# Patient Record
Sex: MALE | URBAN - METROPOLITAN AREA
[De-identification: names, ages, dates, MRNs, and addresses within clinical notes are randomized per-mention and may not be internally consistent; named-entity substitution may affect disease eponyms.]

---

## 2018-04-10 ENCOUNTER — APPOINTMENT (RX ONLY)
Dept: URBAN - METROPOLITAN AREA CLINIC 64 | Facility: CLINIC | Age: 53
Setting detail: DERMATOLOGY
End: 2018-04-10

## 2018-04-10 DIAGNOSIS — L98419 CHRONIC ULCER OF OTHER SPECIFIED SITES: ICD-10-CM

## 2018-04-10 DIAGNOSIS — L98429 CHRONIC ULCER OF OTHER SPECIFIED SITES: ICD-10-CM

## 2018-04-10 PROBLEM — L85.3 XEROSIS CUTIS: Status: ACTIVE | Noted: 2018-04-10

## 2018-04-10 PROBLEM — I10 ESSENTIAL (PRIMARY) HYPERTENSION: Status: ACTIVE | Noted: 2018-04-10

## 2018-04-10 PROBLEM — L98.499 NON-PRESSURE CHRONIC ULCER OF SKIN OF OTHER SITES WITH UNSPECIFIED SEVERITY: Status: ACTIVE | Noted: 2018-04-10

## 2018-04-10 PROCEDURE — ? COUNSELING

## 2018-04-10 PROCEDURE — ? RECOMMENDATIONS

## 2018-04-10 PROCEDURE — 99354: CPT

## 2018-04-10 PROCEDURE — ? PROLONGED SERVICES

## 2018-04-10 PROCEDURE — ? DIAGNOSIS COMMENT

## 2018-04-10 PROCEDURE — 99202 OFFICE O/P NEW SF 15 MIN: CPT

## 2018-04-10 ASSESSMENT — LOCATION ZONE DERM: LOCATION ZONE: ARM

## 2018-04-10 ASSESSMENT — AREA OF WOUND IN CM2: TOTAL AREA OF WOUND IN CM2: 20

## 2018-04-10 ASSESSMENT — LOCATION DETAILED DESCRIPTION DERM: LOCATION DETAILED: RIGHT ANTECUBITAL SKIN

## 2018-04-10 ASSESSMENT — LOCATION SIMPLE DESCRIPTION DERM: LOCATION SIMPLE: RIGHT ELBOW

## 2018-04-10 NOTE — PROCEDURE: COUNSELING
Detail Level: Detailed
Patient Specific Counseling (Will Not Stick From Patient To Patient): Discussed today 4/16/ 2018 with his nephrologist Dr. 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 \\n- consideration for pain management consultation\\n- surgical debridement of necrotic tissue, thru general surgeon\\n- mortality in patients with calciphylaxis increases significantly

## 2018-04-10 NOTE — HPI: SKIN LESION
How Severe Is Your Skin Lesion?: moderate
Has Your Skin Lesion Been Treated?: been treated
Is This A New Presentation, Or A Follow-Up?: Skin Lesion
Additional History: Hospital had dx it as cellulitis, gave meds and it didn’t work.  Last week the dialysis place gave intravenous vancomycin (m,w,f) and it didn’t help. Ulcer is about 4 x 5 cm. Denied constitutional symptoms, no other similar lesions.

## 2018-04-10 NOTE — PROCEDURE: RECOMMENDATIONS
Detail Level: Zone
Recommendations (Free Text): I have reviewed his lab work , phosphorus increases despite parathyroidectomy. He notes he is not a candidate for kidney transplant. He is going on HED 3 times a week. Recently wound was treated for cellulitis. He also notes been no heparin and warfarin in the past, not at this moment. Will coordinate to perform biopsy and culture ASAP.
Recommendation Preamble: The following recommendations were made during the visit: come back for a culture and a bx. Will not prescribe any tx today until we know the results of bx and culture. Advised that pt can get debridement in the OR if this is consistent with calciphylaxis.  Use telfa pads and tape to cover and protect wound. Pt can also use plain Vaseline. Discontinue use of neosporin.

## 2018-04-12 ENCOUNTER — APPOINTMENT (RX ONLY)
Dept: URBAN - METROPOLITAN AREA CLINIC 64 | Facility: CLINIC | Age: 53
Setting detail: DERMATOLOGY
End: 2018-04-12

## 2018-04-12 DIAGNOSIS — L98429 CHRONIC ULCER OF OTHER SPECIFIED SITES: ICD-10-CM

## 2018-04-12 DIAGNOSIS — L98419 CHRONIC ULCER OF OTHER SPECIFIED SITES: ICD-10-CM

## 2018-04-12 PROBLEM — L98.499 NON-PRESSURE CHRONIC ULCER OF SKIN OF OTHER SITES WITH UNSPECIFIED SEVERITY: Status: ACTIVE | Noted: 2018-04-12

## 2018-04-12 PROCEDURE — ? BIOPSY FOR TISSUE CULTURE

## 2018-04-12 PROCEDURE — 11101: CPT

## 2018-04-12 PROCEDURE — ? BIOPSY BY PUNCH METHOD

## 2018-04-12 PROCEDURE — ? PRESCRIPTION

## 2018-04-12 PROCEDURE — 11100: CPT

## 2018-04-12 RX ORDER — TRAMADOL HYDROCHLORIDE AND ACETAMINOPHEN 37.5; 325 MG/1; MG/1
TABLET, COATED ORAL
Qty: 20 | Refills: 0 | COMMUNITY
Start: 2018-04-12

## 2018-04-12 RX ADMIN — TRAMADOL HYDROCHLORIDE AND ACETAMINOPHEN: 37.5; 325 TABLET, COATED ORAL at 18:10

## 2018-04-12 ASSESSMENT — LOCATION DETAILED DESCRIPTION DERM: LOCATION DETAILED: RIGHT VENTRAL PROXIMAL FOREARM

## 2018-04-12 ASSESSMENT — LOCATION ZONE DERM: LOCATION ZONE: ARM

## 2018-04-12 ASSESSMENT — LOCATION SIMPLE DESCRIPTION DERM: LOCATION SIMPLE: RIGHT FOREARM

## 2018-04-12 NOTE — PROCEDURE: BIOPSY BY PUNCH METHOD
Post-Care Instructions: I reviewed with the patient in detail post-care instructions. Patient is to keep the biopsy site dry overnight, and then apply bacitracin twice daily until healed. Patient may apply hydrogen peroxide soaks to remove any crusting.
Bill [id number] For Specimen Handling/Conveyance To Laboratory?: no
Billing Type: Third-Party Bill
Epidermal Sutures: 4-0 Ethilon
Suture Removal: 14 days
Consent: Written consent was obtained and risks were reviewed including but not limited to scarring, infection, bleeding, scabbing, incomplete removal, nerve damage and allergy to anesthesia.
Notification Instructions: Patient will be notified of biopsy results. However, patient instructed to call the office if not contacted within 2 weeks.
Body Location Override (Optional - Billing Will Still Be Based On Selected Body Map Location If Applicable): Right forearm
Punch Size In Mm: 4
Home Suture Removal Text: Patient was provided a home suture removal kit and will remove their sutures at home.  If they have any questions or difficulties they will call the office.
Hemostasis: None
X Depth Of Punch In Cm (Optional): 0
Anesthesia Type: 1% Xylocaine without epinephrine
Detail Level: Simple
Wound Care: Petrolatum
Was A Bandage Applied: Yes
Anesthesia Volume In Cc (Will Not Render If 0): 7
Dressing: bandage
Biopsy Type: H and E

## 2018-04-12 NOTE — PROCEDURE: BIOPSY FOR TISSUE CULTURE
Patient Will Remove Sutures At Home?: No
Hemostasis: None
Post-Care Instructions: I reviewed with the patient in detail post-care instructions. Patient is to keep the biopsy site dry overnight, and then apply bacitracin twice daily until healed. Patient may apply hydrogen peroxide soaks to remove any crusting.
Wound Care: Petrolatum
X Size Of Lesion In Cm (Optional): 0
Dressing: bandage
Detail Level: Simple
Body Location Override (Optional - Billing Will Still Be Based On Selected Body Map Location If Applicable): Right forearm
Biopsy Method: Dermablade
Anesthesia Volume In Cc (Will Not Render If 0): 7
Suture Removal: 14 days
Punch Or Shave Technique: Punch
Home Suture Removal Text: Patient was provided a home suture removal kit and will remove their sutures at home.  If they have any questions or difficulties they will call the office.
Anesthesia Type: 1% Xylocaine without epinephrine
Epidermal Sutures: 4-0 Ethilon
Procedure Information: This plan will only bill for the biopsy.  The individual tests must be ordered in the 'Order Test' plan.  Bacterial Tissue Cultures: 20474-3.  Fungal Tissue Cultures:  578-5.  Mycobacterium Tissue Cultures: 540-5.
Notification Instructions: Patient will be notified of culture results when they are available. Patient was informed that tissue cultures can take some time to complete and that we will inform them when we get a result.
Punch Size In Mm: 4
Consent: Written consent was obtained and risks were reviewed including but not limited to scarring, infection, bleeding, scabbing, nerve damage and allergy to anesthesia.

## 2018-04-12 NOTE — HPI: PROCEDURE (SKIN BIOPSY)
How Severe Is Your Condition?: moderate
Body Location Override (Optional): Right forearm
Additional History: Please see previous note from 2 days ago. Vaseline and telfa has helped with pain.

## 2018-04-26 ENCOUNTER — APPOINTMENT (RX ONLY)
Dept: URBAN - METROPOLITAN AREA CLINIC 64 | Facility: CLINIC | Age: 53
Setting detail: DERMATOLOGY
End: 2018-04-26

## 2018-04-26 DIAGNOSIS — Z48.02 ENCOUNTER FOR REMOVAL OF SUTURES: ICD-10-CM

## 2018-04-26 DIAGNOSIS — L98419 CHRONIC ULCER OF OTHER SPECIFIED SITES: ICD-10-CM

## 2018-04-26 DIAGNOSIS — L98429 CHRONIC ULCER OF OTHER SPECIFIED SITES: ICD-10-CM

## 2018-04-26 PROBLEM — L98.491 NON-PRESSURE CHRONIC ULCER OF SKIN OF OTHER SITES LIMITED TO BREAKDOWN OF SKIN: Status: ACTIVE | Noted: 2018-04-26

## 2018-04-26 PROCEDURE — ? ORDER TESTS

## 2018-04-26 PROCEDURE — ? COUNSELING

## 2018-04-26 PROCEDURE — ? SUTURE REMOVAL (GLOBAL PERIOD)

## 2018-04-26 PROCEDURE — ? ADDITIONAL NOTES

## 2018-04-26 PROCEDURE — ? RADIOLOGY REPORTS REVIEWED

## 2018-04-26 PROCEDURE — 99213 OFFICE O/P EST LOW 20 MIN: CPT

## 2018-04-26 PROCEDURE — 99024 POSTOP FOLLOW-UP VISIT: CPT

## 2018-04-26 ASSESSMENT — LOCATION DETAILED DESCRIPTION DERM
LOCATION DETAILED: RIGHT VENTRAL PROXIMAL FOREARM
LOCATION DETAILED: RIGHT VENTRAL MEDIAL PROXIMAL FOREARM

## 2018-04-26 ASSESSMENT — LOCATION SIMPLE DESCRIPTION DERM: LOCATION SIMPLE: RIGHT FOREARM

## 2018-04-26 ASSESSMENT — AREA OF WOUND IN CM2: TOTAL AREA OF WOUND IN CM2: 12

## 2018-04-26 ASSESSMENT — LOCATION ZONE DERM: LOCATION ZONE: ARM

## 2018-04-26 NOTE — PROCEDURE: ADDITIONAL NOTES
Additional Notes: Patients biopsy results were discussed in detail. \\nPatient advised to discontinue calcium supplement,\\nand to follow up with a general surgeon for a more invasive biopsy to be done at a hospital facility. \\nPatient given referral for general surgeon.

## 2018-04-26 NOTE — PROCEDURE: COUNSELING
Detail Level: Detailed
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 Determining the etiology is a diagnostic challenge including microvascular occlusion vs thrombotic phenomena, vs infectious vs emboli event. Early recognition and evaluation , evaluation provides treatment options.  Pending blood work results to exclude clothing dyscrasia, autoimmune - anti phospholipid syndrome, calciphylaxis vs anticoagulation therapy as a trigger event. He came to visit after several weeks of the onset of his lesion. \\n\\nDiscussed with Sage Mcintosh this condition may have systemic and multi organ involvement and perhaps may required complicated lab evaluation , but perhaps if any worsening or new lesions may have sinister implication including life threatening outcome . \\n\\nI’m still highly suspicious of calciphylaxis although skin biopsy was inconclusive showing granulation tissue. I have recommended a consultation with a gen surgeon for wedge excisional biopsy and additional tissue culture for atypical mycobacterium , deep fungal and bacterial infections that should be done in a control setting OR due to thrombocytopenia and proximity of ulcer to the fistula. \\nContinue with local wound care soap and water and Vaseline non-adherent pads. He is using levacyn ( hypochlorite gel spray ) for wound care . \\nDiscussed consideration of wound vac and hyperbaric chamber, in addition to local wound care. We also discussed pain management thru specialist. \\nPending final culture results .\\n\\nI had a phone conversation with his nephrologist , he was not aware that he was on calcium supplementation. I have recommended considering  IV sodium thiosulfate given high suspicious clinical diagnosis and potential lethal consequences. Patient is amenable to repeat biopsy  and amenable with the plan of care. \\nSigns  of infections were reviewed or new lesions is an indication for ASAP visit. All questions answered. \\nF/u in 4 weeks.

## 2018-04-26 NOTE — PROCEDURE: SUTURE REMOVAL (GLOBAL PERIOD)
Detail Level: Simple
Add 12467 Cpt? (Important Note: In 2017 The Use Of 79206 Is Being Tracked By Cms To Determine Future Global Period Reimbursement For Global Periods): yes